# Patient Record
Sex: MALE | Race: WHITE
[De-identification: names, ages, dates, MRNs, and addresses within clinical notes are randomized per-mention and may not be internally consistent; named-entity substitution may affect disease eponyms.]

---

## 2020-02-11 ENCOUNTER — HOSPITAL ENCOUNTER (EMERGENCY)
Dept: HOSPITAL 72 - EMR | Age: 25
Discharge: HOME | End: 2020-02-11
Payer: MEDICAID

## 2020-02-11 VITALS — HEIGHT: 73 IN | WEIGHT: 180 LBS | BODY MASS INDEX: 23.86 KG/M2

## 2020-02-11 VITALS — SYSTOLIC BLOOD PRESSURE: 140 MMHG | DIASTOLIC BLOOD PRESSURE: 80 MMHG

## 2020-02-11 VITALS — DIASTOLIC BLOOD PRESSURE: 80 MMHG | SYSTOLIC BLOOD PRESSURE: 128 MMHG

## 2020-02-11 VITALS — SYSTOLIC BLOOD PRESSURE: 132 MMHG | DIASTOLIC BLOOD PRESSURE: 77 MMHG

## 2020-02-11 DIAGNOSIS — F15.10: ICD-10-CM

## 2020-02-11 DIAGNOSIS — F10.239: ICD-10-CM

## 2020-02-11 DIAGNOSIS — G40.909: Primary | ICD-10-CM

## 2020-02-11 DIAGNOSIS — K76.0: ICD-10-CM

## 2020-02-11 LAB
ADD MANUAL DIFF: NO
ALBUMIN SERPL-MCNC: 3.5 G/DL (ref 3.4–5)
ALBUMIN/GLOB SERPL: 1.1 {RATIO} (ref 1–2.7)
ALP SERPL-CCNC: 90 U/L (ref 46–116)
ALT SERPL-CCNC: 97 U/L (ref 12–78)
ANION GAP SERPL CALC-SCNC: 15 MMOL/L (ref 5–15)
APPEARANCE UR: CLEAR
APTT PPP: YELLOW S
AST SERPL-CCNC: 131 U/L (ref 15–37)
BASOPHILS NFR BLD AUTO: 1.3 % (ref 0–2)
BILIRUB SERPL-MCNC: 0.7 MG/DL (ref 0.2–1)
BUN SERPL-MCNC: 13 MG/DL (ref 7–18)
CALCIUM SERPL-MCNC: 8 MG/DL (ref 8.5–10.1)
CHLORIDE SERPL-SCNC: 105 MMOL/L (ref 98–107)
CO2 SERPL-SCNC: 23 MMOL/L (ref 21–32)
CREAT SERPL-MCNC: 0.7 MG/DL (ref 0.55–1.3)
EOSINOPHIL NFR BLD AUTO: 0.2 % (ref 0–3)
ERYTHROCYTE [DISTWIDTH] IN BLOOD BY AUTOMATED COUNT: 14.4 % (ref 11.6–14.8)
GLOBULIN SER-MCNC: 3.1 G/DL
GLUCOSE UR STRIP-MCNC: NEGATIVE MG/DL
HCT VFR BLD CALC: 42.7 % (ref 42–52)
HGB BLD-MCNC: 14.7 G/DL (ref 14.2–18)
KETONES UR QL STRIP: (no result)
LEUKOCYTE ESTERASE UR QL STRIP: (no result)
LYMPHOCYTES NFR BLD AUTO: 14.8 % (ref 20–45)
MCV RBC AUTO: 97 FL (ref 80–99)
MONOCYTES NFR BLD AUTO: 8.6 % (ref 1–10)
NEUTROPHILS NFR BLD AUTO: 75.1 % (ref 45–75)
NITRITE UR QL STRIP: NEGATIVE
PH UR STRIP: 6.5 [PH] (ref 4.5–8)
PLATELET # BLD: 254 K/UL (ref 150–450)
POTASSIUM SERPL-SCNC: 3.3 MMOL/L (ref 3.5–5.1)
PROT UR QL STRIP: (no result)
RBC # BLD AUTO: 4.39 M/UL (ref 4.7–6.1)
SODIUM SERPL-SCNC: 143 MMOL/L (ref 136–145)
SP GR UR STRIP: 1.02 (ref 1–1.03)
UROBILINOGEN UR-MCNC: 1 MG/DL (ref 0–1)
WBC # BLD AUTO: 5.6 K/UL (ref 4.8–10.8)

## 2020-02-11 PROCEDURE — 70450 CT HEAD/BRAIN W/O DYE: CPT

## 2020-02-11 PROCEDURE — 80053 COMPREHEN METABOLIC PANEL: CPT

## 2020-02-11 PROCEDURE — 96361 HYDRATE IV INFUSION ADD-ON: CPT

## 2020-02-11 PROCEDURE — 84484 ASSAY OF TROPONIN QUANT: CPT

## 2020-02-11 PROCEDURE — 80156 ASSAY CARBAMAZEPINE TOTAL: CPT

## 2020-02-11 PROCEDURE — 85025 COMPLETE CBC W/AUTO DIFF WBC: CPT

## 2020-02-11 PROCEDURE — 71045 X-RAY EXAM CHEST 1 VIEW: CPT

## 2020-02-11 PROCEDURE — 93005 ELECTROCARDIOGRAM TRACING: CPT

## 2020-02-11 PROCEDURE — 80164 ASSAY DIPROPYLACETIC ACD TOT: CPT

## 2020-02-11 PROCEDURE — 80307 DRUG TEST PRSMV CHEM ANLYZR: CPT

## 2020-02-11 PROCEDURE — 36415 COLL VENOUS BLD VENIPUNCTURE: CPT

## 2020-02-11 PROCEDURE — 96374 THER/PROPH/DIAG INJ IV PUSH: CPT

## 2020-02-11 PROCEDURE — 74177 CT ABD & PELVIS W/CONTRAST: CPT

## 2020-02-11 PROCEDURE — 96375 TX/PRO/DX INJ NEW DRUG ADDON: CPT

## 2020-02-11 PROCEDURE — 81003 URINALYSIS AUTO W/O SCOPE: CPT

## 2020-02-11 PROCEDURE — 99284 EMERGENCY DEPT VISIT MOD MDM: CPT

## 2020-02-11 NOTE — DIAGNOSTIC IMAGING REPORT
Indication: Chest pain

 

Technique: One view of the chest

 

Comparison: none

 

Findings: There is atelectasis in the left perihilar region. The lungs and pleural

spaces are otherwise clear. The heart size is normal

 

Impression: Left perihilar atelectasis. No acute process otherwise

## 2020-02-11 NOTE — NUR
ED Nurse Note:



PT brought in by rahel from [place] for Seizure activity lasting about 5 
min per bystander. pt denies any head injuiry. PT is alert x4. PT reports 
drinking alcohol. pt denies Hx of Seizure.

## 2020-02-11 NOTE — EMERGENCY ROOM REPORT
History of Present Illness


General


Chief Complaint:  Seizure


Source:  Patient





Present Illness


HPI


24-year-old male who admits to having a drinking problem and drinks alcohol on 

daily basis brought in by paramedics post seizure.  Patient reports that he 

himself does not recall having a seizure activity.  Denies any history of 

seizures.  Patient was seen at [facility] emergency department few days ago 

for similar complaint.  Contusions noted on head.  Patient reports a head CT 

was done at [facility] and within normal limits.  Denies any drug use, 

reports that he also smokes tobacco on daily basis.  [place]  Complains of abdominal pain after drinking a lot of alcohol.  

Complains of nausea vomiting as well as diarrhea.  Denies any recent fall or 

injury.  Denies chest pain, shortness of breath, palpitation, dizziness.  

Patient responds to questions.  Appears to be stable with stable vital signs.  

Denies loss of urinary or bowel incontinence.  Patient was found in the street 

after a seizure activity according to the paramedics.  However the seizure was 

not observed.  Patient reports that he drank a lot of alcohol last night.  

Paramedics also suspected methamphetamine use.


Allergies:  


Coded Allergies:  


     No Known Allergies (Unverified , 10/25/18)





Patient History


Past Medical History:  see triage record


Past Surgical History:  unable to obtain


Pertinent Family History:  none


Social History:  Reports: alcohol use


Immunizations:  UTD


Reviewed Nursing Documentation:  PMH: Agreed; PSxH: Agreed





Nursing Documentation-PMH


Past Medical History:  No History, Except For





Review of Systems


All Other Systems:  negative except mentioned in HPI





Physical Exam





Vital Signs








  Date Time  Temp Pulse Resp B/P (MAP) Pulse Ox O2 Delivery O2 Flow Rate FiO2


 


2/11/20 12:56 98.1 125 18 141/87 (105) 99 Room Air  








Sp02 EP Interpretation:  reviewed, normal


General Appearance:  no apparent distress, alert, GCS 15, mild distress


Head:  other - Multiple ecchymosis and contusion noted aviva-orbital and frontal 

lobe


Eyes:  bilateral eye normal inspection, bilateral eye PERRL


ENT:  hearing grossly normal, normal pharynx, no angioedema, normal voice


Neck:  full range of motion, supple, no meningismus, no bony tend, supple/symm/

no masses


Respiratory:  chest non-tender, lungs clear, normal breath sounds, no rhonchi, 

no retraction, no accessory muscle use, no wheezing, speaking full sentences


Cardiovascular #1:  normal peripheral pulses, regular rate, rhythm, no edema, 

no gallop, no JVD, no murmur, normal capillary refill


Cardiovascular #2:  2+ carotid (R), 2+ carotid (L), 2+ radial (R), 2+ radial (L)


Gastrointestinal:  normal inspection, non tender, soft, no mass, no organomegaly

, no peritonitis, no bruit, non-distended, no guarding, no hernia, no pulsatile 

mass, no rebound


Rectal:  deferred


Genitourinary:  no CVA tenderness


Musculoskeletal:  back normal, normal range of motion, no calf tenderness, 

pelvis stable


Neurologic:  alert, motor strength/tone normal, oriented, distal neuro normal, 

oriented x3, sensory intact, responsive, speech normal


Psychiatric:  judgement/insight normal, memory normal, mood/affect normal, no 

suicidal/homicidal ideation


Skin:  no rash, palpation normal, normal color


Lymphatic:  no adenopathy





Medical Decision Making


PA Attestation


All my diagnosis and treatment plans were reviewed ad discussed with my 

supervising physician Dr. Rey


Diagnostic Impression:  


 Primary Impression:  


 Alcohol withdrawal


 Additional Impressions:  


 Seizure disorder


 Methamphetamine abuse


 Fatty liver


 Encounter for smoking cessation counseling


ER Course


24-year-old male who admits to having a drinking problem and drinks alcohol on 

daily basis brought in by paramedics post seizure.  Patient reports that he 

himself does not recall having a seizure activity.  Denies any history of 

seizures.  Patient was seen at [facility] emergency department few days ago 

for similar complaint.  Contusions noted on head.  Patient reports a head CT 

was done at [facility] and within normal limits.  Denies any drug use, 

reports that he also smokes tobacco on daily basis.  [place].  Complains of abdominal pain after drinking a lot of alcohol.  

Complains of nausea vomiting as well as diarrhea.  Denies any recent fall or 

injury.  Denies chest pain, shortness of breath, palpitation, dizziness.  

Patient responds to questions.  Appears to be stable with stable vital signs.  

Denies loss of urinary or bowel incontinence.  Patient was found in the street 

after a seizure activity according to the paramedics.  However the seizure was 

not observed.  Patient reports that he drank a lot of alcohol last night.  

Paramedics also suspected methamphetamine use.





Ddx considered but are not limited to: Dizziness due to alcohol intoxication, 

dizziness unspecified, dizziness due to head trauma, dizziness secondary to 

cardiac reasons, seizure tonic clonic














Vital signs: are WNL, pt. is afebrile








 H&PE are most consistent with: Alcohol withdrawal, seizure secondary to alcohol

, methamphetamine abuse, fatty liver














ORDERS: Seizure orders, nicotine patch per patient request to stop smoking, 

Zofran, omeprazole


ER intervention: NS bolus, Zofran, Pepcid, Toradol








DISCHARGE: At this time pt. is stable for d/c to home. Will provide printed 

patient care instructions, and any necessary prescriptions. Care plan and 

follow up instructions have been discussed with the patient prior to discharge.

  Gave patient a list of facilities that he can fill for alcohol dependency and 

withdrawal.  Also gave him a list of free clinics to establish a primary care 

doctor, follow-up with neurologist, at this time I do not believe the patient 

needs any seizure medication patient does not recall having any seizures and no 

one has ever observed his seizures and patient is going through withdrawal 

symptoms.  However do follow-up with a neurologist.  If worsening symptoms 

return to the emergency room.  Avoid drinking alcohol


EKG Diagnostic Results


Rate:  normal


Rhythm:  NSR


ST Segments:  no acute changes


Other Impression


No acute ST changes





Chest X-Ray Diagnostic Results


Chest X-Ray Diagnostic Results :  


   Chest X-Ray Ordered:  Yes


   # of Views/Limited/Complete:  1 View


   Indication:  Other


   EP Interpretation:  Yes


   PA Xray:  Interpretation reviewed, by supervising MD, and agrees with 

findings.


   Interpretation:  no consolidation, no effusion, no pneumothorax


   Impression:  No acute disease


   Electronically Signed by:  Raquel Pfeiffer PA-C





CT/MRI/US Diagnostic Results


CT/MRI/US Diagnostic Results #1:  


   Imaging Test Ordered:  Head CT no contrast


   Impression


No intracranial bleed, no skull fracture


CT/MRI/US Diagnostic Results #2:  


   Imaging Test Ordered:  CT abdomen pelvis with contrast


   Impression


Fatty liver, no other abnormalities





Last Vital Signs








  Date Time  Temp Pulse Resp B/P (MAP) Pulse Ox O2 Delivery O2 Flow Rate FiO2


 


2/11/20 12:56 98.1 125 18 141/87 (105) 99 Room Air  








Status:  improved


Disposition:  HOME, SELF-CARE


Condition:  Stable


Scripts


Omeprazole (OMEPRAZOLE) 20 Mg Tablet.


20 MG ORAL DAILY, #30 TAB


   Prov: Raquel Bryant         2/11/20 


Nicotine (NICOTINE PATCH) 1 Each Patch.dysq


1 EACH TD DAILY, #14 PATCH


   Prov: Raquel Bryant         2/11/20 


Ondansetron (Zofran) 4 Mg Tablet


4 MG ORAL Q6H PRN for Nausea & Vomiting, #14 TAB


   Prov: Raquel Bryant         2/11/20


Patient Instructions:  Alcohol Withdrawal, Easy-to-Read, Amphetamine oral 

tablets, Fatty Liver, Seizure, Adult, Stimulant Use Disorder-Methamphetamines





Additional Instructions:  


Take medication as directed, follow-up with your primary care provider, avoid 

drinking alcohol, increase oral hydration, if worsening symptoms return to the 

emergency room you need to be evaluated by a neurologist for seizure activities.











Raquel Bryant Feb 11, 2020 13:29

## 2020-02-11 NOTE — DIAGNOSTIC IMAGING REPORT
Indications: Seizure

 

Technique: Spiral acquisitions obtained through the brain. Angled axial and coronal 5

x 5 mm slices were reconstructed. Total dose length product 1098 mGycm.  CTDI vol(s)

53 mGy. Dose reduction achieved using automated exposure control

 

Comparison: 10/25/2018

 

Findings: No acute intracranial hemorrhage or edema, mass effect, nor midline shift.

Normal gray-white differentiation. Normal size ventricles and extra axial CSF spaces.

Intact calvarium. The mastoids are clear. There is bilateral maxillary sinus

opacification, more extensive than that seen previously although the diffusely

demonstrated air-fluid level in the right maxillary sinus is no longer evident.

 

Impression: Negative for acute intracranial bleed or mass effect

 

Sinus disease

 

 

 

 

 

The CT scanner at [facility] is accredited by the [employer] of

[employer] and the scans are performed using protocols designed to limit radiation

exposure to as low as reasonably achievable to attain images of sufficient resolution

adequate for diagnostic evaluation.

## 2020-02-11 NOTE — DIAGNOSTIC IMAGING REPORT
Clinical Indication: Abdominal pain, nausea, vomiting, diarrhea

 

Technique:   No oral contrast utilized, per emergency room physician request  IV

administration nonionic contrast. Venous phase spiral acquisition obtained through

the abdomen and pelvis. Multiplanar reconstructions were generated. Total dose length

product 783 mGycm. CTDIvol(s) 13 mGy. Dose reduction achieved using automated

exposure control

 

 

Comparison: none

 

Findings: Lack of enteric contrast limits assessment of the GI tract. The appendix is

normal. No small bowel distention. No free or loculated intraperitoneal gas or fluid

is evident. The distal esophagus, stomach, duodenum are unremarkable.

 

The liver is diffusely hypoattenuating, consistent with fatty change. It is also

somewhat enlarged. No focal abnormality. The gallbladder, bile ducts, pancreas,

spleen, adrenals, kidneys are unremarkable. No pelvic mass or adenopathy. No

retroperitoneal or mesenteric mass or adenopathy.

 

The included lung bases are clear. The bones are unremarkable.

 

Impression: Limited assessment of the GI tract, due to lack of enteric contrast

administration

 

No acute abnormality

 

Enlarged fatty liver

 

 

The CT scanner at [facility] is accredited by the American College of

[employer]iology and the scans are performed using protocols designed to limit radiation

exposure to as low as reasonably achievable to attain images of sufficient resolution

adequate for diagnostic evaluation.